# Patient Record
Sex: MALE | Race: WHITE | NOT HISPANIC OR LATINO | Employment: OTHER | URBAN - METROPOLITAN AREA
[De-identification: names, ages, dates, MRNs, and addresses within clinical notes are randomized per-mention and may not be internally consistent; named-entity substitution may affect disease eponyms.]

---

## 2019-09-13 ENCOUNTER — HOSPITAL ENCOUNTER (OUTPATIENT)
Dept: URGENT CARE | Facility: CLINIC | Age: 59
Discharge: HOME | End: 2019-09-13

## 2020-11-11 ENCOUNTER — HOSPITAL ENCOUNTER (OUTPATIENT)
Dept: GENERAL RADIOLOGY | Facility: HOSPITAL | Age: 60
Discharge: HOME | End: 2020-11-11
Attending: NURSE PRACTITIONER

## 2021-01-18 ENCOUNTER — OFFICE VISIT CONVERTED (OUTPATIENT)
Dept: GASTROENTEROLOGY | Facility: CLINIC | Age: 61
End: 2021-01-18
Attending: NURSE PRACTITIONER

## 2021-03-12 ENCOUNTER — HOSPITAL ENCOUNTER (OUTPATIENT)
Dept: SLEEP MEDICINE | Facility: HOSPITAL | Age: 61
Discharge: HOME | End: 2021-03-12
Attending: INTERNAL MEDICINE

## 2021-03-25 ENCOUNTER — HOSPITAL ENCOUNTER (OUTPATIENT)
Dept: SLEEP MEDICINE | Facility: HOSPITAL | Age: 61
Discharge: HOME | End: 2021-03-25
Attending: INTERNAL MEDICINE

## 2021-05-10 NOTE — H&P
History and Physical      Patient Name: Igor De Luna   Patient ID: [id number]   Sex: Male   YOB: 1960    Primary Care Provider: PATT COLLINS   Referring Provider: PATT COLLINS    Visit Date: January 18, 2021    Provider: CHANTELL Forbes   Location: [facility]   Location Address: [address] [place] [address], [address]  [place], [place]  [id number]   Location Phone: [phone]          Chief Complaint  · Abdominal pain      History Of Present Illness  The patient is a 60 year old male who presents on referral from PATT COLLINS for a gastroenterology evaluation.      Patient reports periumbilical pressure that radiates to his back on a few occasion's this past November. He   felt a need to have a BM and after he did the pain was relieved. Has not noticed the pain in the last 3 months. He does recall during the time he was working out often and eating handfuls of nuts for protein intake. He has since stopped and that is the only thing he has changed since symptoms resolved. Normally has a bowel movement 2x daily, formed stool. Denies any hematochezia, melena, or family hx of colon cancer. Appetite and weight stable. Denies frequent NSAID usage.     Colonoscopy 9+ years ago at [facility], requesting records. Pt reports it was normal.     CT of abdomen pelvis with contrast 11/11/2020: Diverticulosis. No evidence for acute abnormality throughout the abdomen or pelvis.       Medication List  Allegra-D 12 Hour  mg oral tablet extended release 12 hr; Arthritis Pain (diclofenac) 1 % topical gel         Allergy List  NO KNOWN DRUG ALLERGIES       Allergies Reconciled  Family Medical History  No family history of colorectal cancer         Social History  Tobacco (Never)         Review of Systems  · Constitutional  o Denies  o : chills, fever  · Eyes  o Denies  o : blurred vision, changes in vision  · Cardiovascular  o Denies  o : chest pain,  syncope  · Respiratory  o Denies  o : shortness of breath, dry cough  · Gastrointestinal  o Admits  o : See HPI  · Genitourinary  o Denies  o : dysuria, blood in urine  · Integument  o Denies  o : rash, new skin lesions  · Neurologic  o Denies  o : altered mental status, tingling or numbness  · Musculoskeletal  o Denies  o : joint pain, limitation of motion  · Endocrine  o Denies  o : weight gain, weight loss  · Psychiatric  o Denies  o : anxiety, depression      Physical Examination  · Constitutional  o Appearance  o : well developed, well-nourished, in no acute distress  · Eyes  o Vision  o :   § Visual Fields  § : eyes move symmetrical in all directions  o Sclerae  o : anicteric  o Pupils and Irises  o : pupils equal and symmetrical  · Neck  o Inspection/Palpation  o : supple  · Respiratory  o Respiratory Effort  o : breathing unlabored  o Inspection of Chest  o : normal appearance, no retractions  o Auscultation of Lungs  o : clear to auscultation bilaterally  · Cardiovascular  o Heart  o :   § Auscultation of Heart  § : no murmurs, gallops or rubs  · Gastrointestinal  o Abdominal Examination  o : soft, nontender to palpation, with normal active bowel sounds, no appreciable hepatosplenomegaly  o Digital Rectal Exam  o : deferred  · Lymphatic  o Neck  o : no palpable lymphadenopathy  · Skin and Subcutaneous Tissue  o General Inspection  o : without focal lesions; turgor is normal  · Psychiatric  o General  o : Alert and oriented x3  o Mood and Affect  o : Mood and affect are appropriate to circumstances          Assessment  · Diverticulosis     562.10/K57.90      Plan  · Medications  o Medications have been Reconciled  · Instructions  o Information given on current diagnoses.  o Lifestyle modifications discussed.  o Requesting pt's last colonoscopy. Will add to recall after when due for screening. Instructed pt to please call office if abdominal pain returns.  o Also educated on diverticulosis and preventing  constipation as well as limiting nut and seed intake.   o Electronically Identified Patient Education Materials Provided Electronically  · Disposition  o Follow up PRN-Call if any change in bowel pattern, abdominal pain, rectal bleeding, or any new GI complaint            Electronically Signed by: CHANTELL Forbes -Author on January 18, 2021 12:28:25 PM

## 2021-05-14 VITALS
HEIGHT: 64 IN | WEIGHT: 157.37 LBS | HEART RATE: 86 BPM | SYSTOLIC BLOOD PRESSURE: 150 MMHG | BODY MASS INDEX: 26.87 KG/M2 | DIASTOLIC BLOOD PRESSURE: 86 MMHG

## 2021-05-21 ENCOUNTER — HOSPITAL ENCOUNTER (OUTPATIENT)
Dept: SLEEP MEDICINE | Facility: HOSPITAL | Age: 61
Discharge: HOME | End: 2021-05-21
Attending: INTERNAL MEDICINE

## 2021-08-27 ENCOUNTER — OFFICE VISIT (OUTPATIENT)
Dept: SLEEP MEDICINE | Facility: HOSPITAL | Age: 61
End: 2021-08-27

## 2021-08-27 VITALS
WEIGHT: 164 LBS | SYSTOLIC BLOOD PRESSURE: 114 MMHG | HEART RATE: 71 BPM | OXYGEN SATURATION: 96 % | BODY MASS INDEX: 28 KG/M2 | DIASTOLIC BLOOD PRESSURE: 74 MMHG | TEMPERATURE: 98.4 F | HEIGHT: 64 IN

## 2021-08-27 DIAGNOSIS — G47.33 OSA (OBSTRUCTIVE SLEEP APNEA): Primary | ICD-10-CM

## 2021-08-27 PROCEDURE — G0463 HOSPITAL OUTPT CLINIC VISIT: HCPCS

## 2021-08-27 NOTE — PROGRESS NOTES
"                      [facility]                          Chief Complaint:   Follow up for obstructive sleep apnea    History of present illness:   Subjective     MATTHEW:  HST 3/25/2021: RANJEET 33/H; Gabriel SpO2 75%; hypoxic burden 56-min (11%); AURE 32/H.    Patient started on CPAP therapy.  He is here today for Follow-up on CPAP.  He reported improvement in his sleep at night and daytime alertness.    ESS: Total score: 0         Objective   Vital Signs:  Vitals:    08/27/21 0900   BP: 114/74   Pulse: 71   Temp: 98.4 °F (36.9 °C)   SpO2: 96%   Weight: 74.4 kg (164 lb)   Height: 162.6 cm (64\")     Body mass index is 28.15 kg/m².          Physical Exam:   General Appearance:    Alert, cooperative, in no acute distress   ENMT:  Freidman score 3,   Neck:  Trachea midline. No thyromegaly.   Lungs:     Clear to auscultation,respirations regular, even and                  unlabored    Heart:    Regular rhythm and normal rate, normal S1 and S2, no            Murmur.       Neuro:   Conscious, alert, oriented x3. Appropriate mood and affect.    Extremities:   Moves all extremities well, no edema, no cyanosis, no             Redness              Diagnostic data:    CPAP download showed:  Date: Last 30 days  Usage (days): 100 %  Days used>4h: 97 %  AHI: 1.9/h  Leak: 4.6   Usage: 6h and 34 min  P 90% : 9  Auto CPAP: 5-15 cm H2O    Assessment   1. MATTHEW        PLAN:  Discussed the result of the download.   Patient is compliant with therapy and clinically benefit from treatment.  Patient was encouraged to continue using his CPAP.  Refill supplies.                  This note was dictated utilizing Dragon dictation  "

## 2022-04-25 ENCOUNTER — TRANSCRIBE ORDERS (OUTPATIENT)
Dept: ADMINISTRATIVE | Facility: HOSPITAL | Age: 62
End: 2022-04-25

## 2022-04-25 DIAGNOSIS — M54.50 LOW BACK PAIN, UNSPECIFIED BACK PAIN LATERALITY, UNSPECIFIED CHRONICITY, UNSPECIFIED WHETHER SCIATICA PRESENT: Primary | ICD-10-CM

## 2022-05-12 ENCOUNTER — HOSPITAL ENCOUNTER (OUTPATIENT)
Dept: MRI IMAGING | Facility: HOSPITAL | Age: 62
Discharge: HOME | End: 2022-05-12
Admitting: NURSE PRACTITIONER

## 2022-05-12 DIAGNOSIS — M54.50 LOW BACK PAIN, UNSPECIFIED BACK PAIN LATERALITY, UNSPECIFIED CHRONICITY, UNSPECIFIED WHETHER SCIATICA PRESENT: ICD-10-CM

## 2022-05-12 PROCEDURE — 72148 MRI LUMBAR SPINE W/O DYE: CPT

## 2022-07-01 ENCOUNTER — OFFICE VISIT (OUTPATIENT)
Dept: NEUROSURGERY | Facility: CLINIC | Age: 62
End: 2022-07-01

## 2022-07-01 VITALS
HEIGHT: 63 IN | WEIGHT: 162.6 LBS | HEART RATE: 67 BPM | BODY MASS INDEX: 28.81 KG/M2 | SYSTOLIC BLOOD PRESSURE: 136 MMHG | DIASTOLIC BLOOD PRESSURE: 71 MMHG

## 2022-07-01 DIAGNOSIS — M51.26 HERNIATED NUCLEUS PULPOSUS, L4-5: Primary | ICD-10-CM

## 2022-07-01 DIAGNOSIS — M54.42 CHRONIC MIDLINE LOW BACK PAIN WITH LEFT-SIDED SCIATICA: ICD-10-CM

## 2022-07-01 DIAGNOSIS — M47.816 LUMBAR FACET ARTHROPATHY: ICD-10-CM

## 2022-07-01 DIAGNOSIS — G89.29 CHRONIC MIDLINE LOW BACK PAIN WITH LEFT-SIDED SCIATICA: ICD-10-CM

## 2022-07-01 PROCEDURE — 99204 OFFICE O/P NEW MOD 45 MIN: CPT | Performed by: PHYSICIAN ASSISTANT

## 2022-07-01 RX ORDER — ZINC SULFATE 50(220)MG
220 CAPSULE ORAL DAILY
COMMUNITY

## 2022-07-01 RX ORDER — CHLORAL HYDRATE 500 MG
CAPSULE ORAL
COMMUNITY

## 2022-07-01 RX ORDER — METHOCARBAMOL 500 MG/1
500 TABLET, FILM COATED ORAL 3 TIMES DAILY
COMMUNITY
Start: 2022-04-25

## 2022-07-01 RX ORDER — FEXOFENADINE HCL 180 MG/1
180 TABLET ORAL DAILY
COMMUNITY

## 2022-07-01 RX ORDER — DICLOFENAC SODIUM 75 MG/1
1 TABLET, DELAYED RELEASE ORAL 2 TIMES DAILY
COMMUNITY
Start: 2022-05-19

## 2022-07-01 RX ORDER — MELATONIN
1000 DAILY
COMMUNITY

## 2022-07-01 RX ORDER — VIT C/B6/B5/MAGNESIUM/HERB 173 50-5-6-5MG
CAPSULE ORAL
COMMUNITY

## 2022-07-01 NOTE — PROGRESS NOTES
"Chief Complaint  Neck Pain, Back Pain, and Numbness (Left leg / greater toe)    Subjective          Igor De Luna who is a 62 y.o. year old male who presents to [facility] for Evaluation of the Spine.     The patient complains of pain located in the Lumbar Spine.  Patients states the pain has been present for \"years\". He reports new pain starting several months ago, however. The pain came on gradually.  The pain scaled level is 7.  The pain does not radiate.  The pain is Intermittent and described as dull and aching.  The pain is worse at no particular time of day. Patient states Heat and Muscle Relaxants makes the pain better.  Patient states Prolonged Standing, Prolonged Sitting and Bending makes the pain worse.    Associated Symptoms Include: Numbness in the left leg to the big toe, this is intermittent. He denies weakness in the left leg. He denies loss of bowel or bladder control.  Conservative Interventions Include: NSAIDs that were ineffective. and Muscle Relaxants that were somewhat effective.    Was this the result of an injury or accident?: No    History of Previous Spinal Surgery?: No     reports that he has never smoked. He does not have any smokeless tobacco history on file.    Review of Systems   Musculoskeletal: Positive for back pain and neck pain.   Neurological: Positive for numbness.        Objective   Vital Signs:   /71   Pulse 67   Ht 160 cm (63\")   Wt 73.8 kg (162 lb 9.6 oz)   BMI 28.80 kg/m²       Physical Exam  Constitutional:       Appearance: Normal appearance. He is normal weight.   Pulmonary:      Effort: Pulmonary effort is normal.   Musculoskeletal:         General: Tenderness (midline lumbar spine, right SI joint area) present.      Comments: SLR negative bilaterally   Neurological:      General: No focal deficit present.      Mental Status: He is alert and oriented to person, place, and time.      Sensory: No sensory deficit.      " Motor: No weakness.      Deep Tendon Reflexes: Reflexes normal.   Psychiatric:         Mood and Affect: Mood normal.         Behavior: Behavior normal.        Neurologic Exam     Mental Status   Oriented to person, place, and time.        Result Review     I have personally reviewed the MRI of lumbar spine without contrast from 5/12/2022 which shows multilevel degenerative disc disease and facet arthritis, there is moderate central canal stenosis at L2-L3 with mild to moderate bilateral foraminal stenosis worse on the right, there is moderate central canal stenosis at L4-L5 with mild bilateral foraminal stenosis.       Assessment and Plan {CC Problem List  Visit Diagnosis   ROS  Review (Popup)  [facility]  Quality  BestPractice  Medications  SmartSets  SnapShot Encounters  Media :23}   Diagnoses and all orders for this visit:    1. Herniated nucleus pulposus, L4-5 (Primary)  -     Ambulatory Referral to Physical Therapy Evaluate and treat; Heat, Electrotherapy; E-stim; Stretching, ROM, Strengthening    2. Chronic midline low back pain with left-sided sciatica  -     Ambulatory Referral to Physical Therapy Evaluate and treat; Heat, Electrotherapy; E-stim; Stretching, ROM, Strengthening    3. Lumbar facet arthropathy  -     Ambulatory Referral to Physical Therapy Evaluate and treat; Heat, Electrotherapy; E-stim; Stretching, ROM, Strengthening    His pain is mostly in the back.    He does have numbness in the left leg to the big toe. He has stenosis on the left at L4-L5 which may affect the L4 nerve. Without pain in the leg I do not expect that the benefit of surgery would outweigh the risk. It would not help his back pain.    He could consider a trial of physical therapy targeting the lumbar spine and core strength. I will refer him today.    He could consider a trial of LESB for the right leg numbness or MBBs/RFA in the lumbar spine for the back pain. He is deferring pain management referral  today.    The patient was counseled on basic recommendations for the reduction and prevention of back, neck, or spine pain in association with spinal disorders, including: cessation/avoidance of nicotine use, maintenance of a healthy BMI and weight, focusing on building/maintaining core strength through core exercise, and avoidance of activities which worsen the pain. The patient will monitor for changes in symptoms and notify our clinic of these changes as needed.    He will follow-up here PRN for failure to improve or worsening symptoms.    Follow Up   Return if symptoms worsen or fail to improve.  Patient was given instructions and counseling regarding his condition or for health maintenance advice. Please see specific information pulled into the AVS if appropriate.

## 2022-07-23 ENCOUNTER — APPOINTMENT (OUTPATIENT)
Dept: CARDIOLOGY | Facility: HOSPITAL | Age: 62
End: 2022-07-23

## 2022-07-23 ENCOUNTER — HOSPITAL ENCOUNTER (EMERGENCY)
Facility: HOSPITAL | Age: 62
Discharge: HOME | End: 2022-07-23
Attending: EMERGENCY MEDICINE | Admitting: EMERGENCY MEDICINE

## 2022-07-23 VITALS
SYSTOLIC BLOOD PRESSURE: 108 MMHG | HEART RATE: 74 BPM | WEIGHT: 156.09 LBS | HEIGHT: 63 IN | TEMPERATURE: 97.9 F | BODY MASS INDEX: 27.66 KG/M2 | RESPIRATION RATE: 16 BRPM | OXYGEN SATURATION: 99 % | DIASTOLIC BLOOD PRESSURE: 73 MMHG

## 2022-07-23 DIAGNOSIS — L03.115 CELLULITIS OF RIGHT LOWER LEG: ICD-10-CM

## 2022-07-23 DIAGNOSIS — M79.661 RIGHT CALF PAIN: Primary | ICD-10-CM

## 2022-07-23 LAB
ALBUMIN SERPL-MCNC: 4 G/DL (ref 3.5–5.2)
ALBUMIN/GLOB SERPL: 1.5 G/DL
ALP SERPL-CCNC: 87 U/L (ref 39–117)
ALT SERPL W P-5'-P-CCNC: 68 U/L (ref 1–41)
ANION GAP SERPL CALCULATED.3IONS-SCNC: 9.5 MMOL/L (ref 5–15)
AST SERPL-CCNC: 35 U/L (ref 1–40)
BASOPHILS # BLD AUTO: 0.03 10*3/MM3 (ref 0–0.2)
BASOPHILS NFR BLD AUTO: 0.3 % (ref 0–1.5)
BH CV LOWER VASCULAR LEFT COMMON FEMORAL AUGMENT: NORMAL
BH CV LOWER VASCULAR LEFT COMMON FEMORAL COMPETENT: NORMAL
BH CV LOWER VASCULAR LEFT COMMON FEMORAL COMPRESS: NORMAL
BH CV LOWER VASCULAR LEFT COMMON FEMORAL PHASIC: NORMAL
BH CV LOWER VASCULAR LEFT COMMON FEMORAL SPONT: NORMAL
BH CV LOWER VASCULAR RIGHT COMMON FEMORAL AUGMENT: NORMAL
BH CV LOWER VASCULAR RIGHT COMMON FEMORAL COMPETENT: NORMAL
BH CV LOWER VASCULAR RIGHT COMMON FEMORAL COMPRESS: NORMAL
BH CV LOWER VASCULAR RIGHT COMMON FEMORAL PHASIC: NORMAL
BH CV LOWER VASCULAR RIGHT COMMON FEMORAL SPONT: NORMAL
BH CV LOWER VASCULAR RIGHT DISTAL FEMORAL COMPRESS: NORMAL
BH CV LOWER VASCULAR RIGHT GASTRONEMIUS COMPRESS: NORMAL
BH CV LOWER VASCULAR RIGHT GREATER SAPH AK COMPRESS: NORMAL
BH CV LOWER VASCULAR RIGHT GREATER SAPH BK COMPRESS: NORMAL
BH CV LOWER VASCULAR RIGHT LESSER SAPH COMPRESS: NORMAL
BH CV LOWER VASCULAR RIGHT MID FEMORAL AUGMENT: NORMAL
BH CV LOWER VASCULAR RIGHT MID FEMORAL COMPETENT: NORMAL
BH CV LOWER VASCULAR RIGHT MID FEMORAL COMPRESS: NORMAL
BH CV LOWER VASCULAR RIGHT MID FEMORAL PHASIC: NORMAL
BH CV LOWER VASCULAR RIGHT MID FEMORAL SPONT: NORMAL
BH CV LOWER VASCULAR RIGHT PERONEAL COMPRESS: NORMAL
BH CV LOWER VASCULAR RIGHT POPLITEAL AUGMENT: NORMAL
BH CV LOWER VASCULAR RIGHT POPLITEAL COMPETENT: NORMAL
BH CV LOWER VASCULAR RIGHT POPLITEAL COMPRESS: NORMAL
BH CV LOWER VASCULAR RIGHT POPLITEAL PHASIC: NORMAL
BH CV LOWER VASCULAR RIGHT POPLITEAL SPONT: NORMAL
BH CV LOWER VASCULAR RIGHT POSTERIOR TIBIAL COMPRESS: NORMAL
BH CV LOWER VASCULAR RIGHT PROXIMAL FEMORAL COMPRESS: NORMAL
BH CV LOWER VASCULAR RIGHT SAPHENOFEMORAL JUNCTION COMPRESS: NORMAL
BILIRUB SERPL-MCNC: 0.4 MG/DL (ref 0–1.2)
BUN SERPL-MCNC: 22 MG/DL (ref 8–23)
BUN/CREAT SERPL: 18.3 (ref 7–25)
CALCIUM SPEC-SCNC: 9.4 MG/DL (ref 8.6–10.5)
CHLORIDE SERPL-SCNC: 103 MMOL/L (ref 98–107)
CO2 SERPL-SCNC: 25.5 MMOL/L (ref 22–29)
CREAT SERPL-MCNC: 1.2 MG/DL (ref 0.76–1.27)
D DIMER PPP FEU-MCNC: 0.68 MCGFEU/ML (ref 0–0.57)
DEPRECATED RDW RBC AUTO: 41.7 FL (ref 37–54)
EGFRCR SERPLBLD CKD-EPI 2021: 69.2 ML/MIN/1.73
EOSINOPHIL # BLD AUTO: 0.33 10*3/MM3 (ref 0–0.4)
EOSINOPHIL NFR BLD AUTO: 3.2 % (ref 0.3–6.2)
ERYTHROCYTE [DISTWIDTH] IN BLOOD BY AUTOMATED COUNT: 12.6 % (ref 12.3–15.4)
GLOBULIN UR ELPH-MCNC: 2.7 GM/DL
GLUCOSE SERPL-MCNC: 110 MG/DL (ref 65–99)
HCT VFR BLD AUTO: 43.6 % (ref 37.5–51)
HGB BLD-MCNC: 15.4 G/DL (ref 13–17.7)
IMM GRANULOCYTES # BLD AUTO: 0.07 10*3/MM3 (ref 0–0.05)
IMM GRANULOCYTES NFR BLD AUTO: 0.7 % (ref 0–0.5)
LYMPHOCYTES # BLD AUTO: 2.56 10*3/MM3 (ref 0.7–3.1)
LYMPHOCYTES NFR BLD AUTO: 24.5 % (ref 19.6–45.3)
MAXIMAL PREDICTED HEART RATE: 160 BPM
MCH RBC QN AUTO: 31.8 PG (ref 26.6–33)
MCHC RBC AUTO-ENTMCNC: 35.3 G/DL (ref 31.5–35.7)
MCV RBC AUTO: 89.9 FL (ref 79–97)
MONOCYTES # BLD AUTO: 0.88 10*3/MM3 (ref 0.1–0.9)
MONOCYTES NFR BLD AUTO: 8.4 % (ref 5–12)
NEUTROPHILS NFR BLD AUTO: 6.57 10*3/MM3 (ref 1.7–7)
NEUTROPHILS NFR BLD AUTO: 62.9 % (ref 42.7–76)
NRBC BLD AUTO-RTO: 0 /100 WBC (ref 0–0.2)
PLATELET # BLD AUTO: 328 10*3/MM3 (ref 140–450)
PMV BLD AUTO: 9.3 FL (ref 6–12)
POTASSIUM SERPL-SCNC: 4.2 MMOL/L (ref 3.5–5.2)
PROT SERPL-MCNC: 6.7 G/DL (ref 6–8.5)
RBC # BLD AUTO: 4.85 10*6/MM3 (ref 4.14–5.8)
SODIUM SERPL-SCNC: 138 MMOL/L (ref 136–145)
STRESS TARGET HR: 136 BPM
WBC NRBC COR # BLD: 10.44 10*3/MM3 (ref 3.4–10.8)

## 2022-07-23 PROCEDURE — 93971 EXTREMITY STUDY: CPT | Performed by: SURGERY

## 2022-07-23 PROCEDURE — 85025 COMPLETE CBC W/AUTO DIFF WBC: CPT

## 2022-07-23 PROCEDURE — 80053 COMPREHEN METABOLIC PANEL: CPT

## 2022-07-23 PROCEDURE — 85379 FIBRIN DEGRADATION QUANT: CPT

## 2022-07-23 PROCEDURE — 99283 EMERGENCY DEPT VISIT LOW MDM: CPT

## 2022-07-23 PROCEDURE — 93971 EXTREMITY STUDY: CPT

## 2022-07-23 RX ORDER — KETOROLAC TROMETHAMINE 30 MG/ML
30 INJECTION, SOLUTION INTRAMUSCULAR; INTRAVENOUS ONCE
Status: DISCONTINUED | OUTPATIENT
Start: 2022-07-23 | End: 2022-07-23

## 2022-07-23 RX ORDER — SULFAMETHOXAZOLE AND TRIMETHOPRIM 800; 160 MG/1; MG/1
1 TABLET ORAL 2 TIMES DAILY
Qty: 20 TABLET | Refills: 0 | Status: SHIPPED | OUTPATIENT
Start: 2022-07-23

## 2022-07-23 RX ORDER — DIPHENHYDRAMINE HYDROCHLORIDE 50 MG/ML
25 INJECTION INTRAMUSCULAR; INTRAVENOUS ONCE
Status: DISCONTINUED | OUTPATIENT
Start: 2022-07-23 | End: 2022-07-23

## 2022-07-23 RX ORDER — METOCLOPRAMIDE HYDROCHLORIDE 5 MG/ML
10 INJECTION INTRAMUSCULAR; INTRAVENOUS ONCE
Status: DISCONTINUED | OUTPATIENT
Start: 2022-07-23 | End: 2022-07-23

## 2022-07-23 NOTE — DISCHARGE INSTRUCTIONS
Take Bactrim as directed.  Apply heat packs 15 to 20 minutes at a time 4-5 times a day to improve the pain and swelling.  Follow-up with your primary care provider in 3 days if your symptoms are not improving.  Return to the emergency department if you have worsening swelling, chest pain, or shortness of breath.

## 2022-07-23 NOTE — ED PROVIDER NOTES
"Time: 8:53 AM EDT  Arrived by: private car  Chief Complaint: Right leg pain  History provided by: Patient  History is limited by: N/A     History of Present Illness:  Patient is a 62 y.o. year old male who presents to the emergency department with right leg pain.    Patient presents the emergency department today complaining of right leg pain with swelling and redness.  Patient admits to having what he thought was a charley horse for the past 2 days, but noticed this morning that there was a \"lump\" and redness to his posterior right calf.  Patient states the right calf is painful with weightbearing.  Patient states he was diagnosed with COVID earlier this month, and has been feeling well for approximately 2 weeks.  Patient denies being on any blood thinners.  Patient denies history of DVT.  Patient denies chest pain, shortness of breath, other associated symptoms.  No other complaints.      History provided by:  Patient   used: No    Leg Pain  Location:  Leg  Injury: no    Leg location:  R lower leg  Pain details:     Quality:  Cramping    Severity:  Mild    Onset quality:  Gradual    Duration: 3 days.    Timing:  Constant    Progression:  Waxing and waning  Chronicity:  New  Prior injury to area:  No  Worsened by:  Bearing weight  Associated symptoms: no back pain, no decreased ROM, no fatigue, no fever, no itching, no muscle weakness, no neck pain, no numbness, no stiffness, no swelling and no tingling        Similar Symptoms Previously: No  Recently seen: No      Patient Care Team  Primary Care Provider: Provider, No Known    Past Medical History:     No Known Allergies  Past Medical History:   Diagnosis Date   • Back problem    • Headache    • Hearing loss      Past Surgical History:   Procedure Laterality Date   • SHOULDER SURGERY       Family History   Problem Relation Age of Onset   • Cancer Father    • Cancer Sister    • Liver disease Brother        Home Medications:  Prior to Admission " medications    Medication Sig Start Date End Date Taking? Authorizing Provider   cholecalciferol (VITAMIN D3) 25 MCG (1000 UT) tablet Take 1,000 Units by mouth Daily.    Kenya Mendenhall MD   Cyanocobalamin ER 1000 MCG tablet controlled-release Take  by mouth.    Kenya Mendenhall MD   diclofenac (VOLTAREN) 75 MG EC tablet Take 1 tablet by mouth 2 (Two) Times a Day. 5/19/22   Kenya Mendenhall MD   ELDERBERRY PO Take  by mouth.    Kenya Mendenhall MD   fexofenadine (ALLEGRA) 180 MG tablet Take 180 mg by mouth Daily.    Kenya Mendenhall MD   Flaxseed, Linseed, (FLAX SEED OIL PO) Take  by mouth.    Kenya Mendenhall MD   methocarbamol (ROBAXIN) 500 MG tablet Take 500 mg by mouth 3 (Three) Times a Day. 4/25/22   Kenya Mendenhall MD   MULTIPLE VITAMINS-CALCIUM PO Take  by mouth.    Kenya Mendenhall MD   Omega-3 Fatty Acids (fish oil) 1000 MG capsule capsule Take  by mouth.    Kenya Mendenhall MD   Quercetin 250 MG tablet Take  by mouth.    Kenya Mendenhall MD   Turmeric 500 MG capsule Take  by mouth.    Kenya Mendenhall MD   Vitamin A 3 MG (10657 UT) capsule Take 10,000 Units by mouth Daily.    Kenya Mendenhall MD   Zinc 50 MG capsule Take  by mouth.    Kenya Mendenhall MD   zinc sulfate (ZINCATE) 220 (50 Zn) MG capsule Take 220 mg by mouth Daily.    Kenya Mendenhall MD        Social History:   Social History     Tobacco Use   • Smoking status: Never Smoker   • Smokeless tobacco: Never Used   Vaping Use   • Vaping Use: Never used   Substance Use Topics   • Drug use: Never     Recent travel: no     Review of Systems:  Review of Systems   Constitutional: Negative for chills, fatigue and fever.   HENT: Negative for congestion and ear pain.    Eyes: Negative for pain.   Respiratory: Negative for cough and shortness of breath.    Cardiovascular: Negative for chest pain.   Gastrointestinal: Negative for abdominal pain, diarrhea, nausea and vomiting.  "  Genitourinary: Negative for dysuria and hematuria.   Musculoskeletal: Negative for back pain, myalgias, neck pain and stiffness.        Right calf pain   Skin: Negative for itching and rash.   Neurological: Negative for dizziness and headaches.        Physical Exam:  /70 (Patient Position: Sitting)   Pulse 74   Temp 98 °F (36.7 °C) (Oral)   Resp 18   Ht 160 cm (63\")   Wt 70.8 kg (156 lb 1.4 oz)   SpO2 97%   BMI 27.65 kg/m²     Physical Exam  Vitals and nursing note reviewed.   Constitutional:       Appearance: Normal appearance. He is normal weight.   HENT:      Head: Normocephalic and atraumatic.      Nose: Nose normal.   Eyes:      Extraocular Movements: Extraocular movements intact.      Conjunctiva/sclera: Conjunctivae normal.      Pupils: Pupils are equal, round, and reactive to light.   Cardiovascular:      Rate and Rhythm: Normal rate and regular rhythm.      Heart sounds: Normal heart sounds.   Pulmonary:      Effort: Pulmonary effort is normal.      Breath sounds: Normal breath sounds.   Abdominal:      General: Abdomen is flat. Bowel sounds are normal.      Palpations: Abdomen is soft.      Tenderness: There is no abdominal tenderness.   Musculoskeletal:         General: Normal range of motion.      Cervical back: Normal range of motion and neck supple.        Legs:    Skin:     General: Skin is warm and dry.   Neurological:      General: No focal deficit present.      Mental Status: He is alert and oriented to person, place, and time.   Psychiatric:         Mood and Affect: Mood normal.         Behavior: Behavior normal.         Thought Content: Thought content normal.         Judgment: Judgment normal.                Medications in the Emergency Department:  Medications - No data to display     Labs  Lab Results (last 24 hours)     Procedure Component Value Units Date/Time    CBC & Differential [[id number]]  (Abnormal) Collected: 07/23/22 0856    Specimen: Blood Updated: 07/23/22 0912    " Narrative:      The following orders were created for panel order CBC & Differential.  Procedure                               Abnormality         Status                     ---------                               -----------         ------                     CBC Auto Differential[[id number]]        Abnormal            Final result                 Please view results for these tests on the individual orders.    Comprehensive Metabolic Panel [[id number]]  (Abnormal) Collected: 07/23/22 0856    Specimen: Blood Updated: 07/23/22 0941     Glucose 110 mg/dL      BUN 22 mg/dL      Creatinine 1.20 mg/dL      Sodium 138 mmol/L      Potassium 4.2 mmol/L      Chloride 103 mmol/L      CO2 25.5 mmol/L      Calcium 9.4 mg/dL      Total Protein 6.7 g/dL      Albumin 4.00 g/dL      ALT (SGPT) 68 U/L      AST (SGOT) 35 U/L      Alkaline Phosphatase 87 U/L      Total Bilirubin 0.4 mg/dL      Globulin 2.7 gm/dL      A/G Ratio 1.5 g/dL      BUN/Creatinine Ratio 18.3     Anion Gap 9.5 mmol/L      eGFR 69.2 mL/min/1.73      Comment: National Kidney Foundation and American Society of Nephrology (ASN) Task Force recommended calculation based on the Chronic Kidney Disease Epidemiology Collaboration (CKD-EPI) equation refit without adjustment for race.       Narrative:      GFR Normal >60  Chronic Kidney Disease <60  Kidney Failure <15      D-dimer, Quantitative [[id number]]  (Abnormal) Collected: 07/23/22 0856    Specimen: Blood Updated: 07/23/22 0927     D-Dimer, Quantitative 0.68 MCGFEU/mL     Narrative:      The Stago D-Dimer test used in conjunction with a clinical pretest probability (PTP) assessment model, has been approved by the FDA to rule out the presence of venous thromboembolism (VTE) in outpatients suspected of deep venous thrombosis (DVT) or pulmonary embolism (PE). The cut-off for negative predictive value is <0.50 MCGFEU/mL.    CBC Auto Differential [[id number]]  (Abnormal) Collected: 07/23/22 0856    Specimen: Blood Updated:  07/23/22 0912     WBC 10.44 10*3/mm3      RBC 4.85 10*6/mm3      Hemoglobin 15.4 g/dL      Hematocrit 43.6 %      MCV 89.9 fL      MCH 31.8 pg      MCHC 35.3 g/dL      RDW 12.6 %      RDW-SD 41.7 fl      MPV 9.3 fL      Platelets 328 10*3/mm3      Neutrophil % 62.9 %      Lymphocyte % 24.5 %      Monocyte % 8.4 %      Eosinophil % 3.2 %      Basophil % 0.3 %      Immature Grans % 0.7 %      Neutrophils, Absolute 6.57 10*3/mm3      Lymphocytes, Absolute 2.56 10*3/mm3      Monocytes, Absolute 0.88 10*3/mm3      Eosinophils, Absolute 0.33 10*3/mm3      Basophils, Absolute 0.03 10*3/mm3      Immature Grans, Absolute 0.07 10*3/mm3      nRBC 0.0 /100 WBC            Imaging:  No Radiology Exams Resulted Within Past 24 Hours    Procedures:  Procedures    Progress  ED Course as of 07/23/22 1151   Sat Jul 23, 2022   1141 Vascular lab states patient is negative for DVT. [MD]   1147 Patient D-dimers is noted to ne elevated.  Patient pulse ox on room air is normal, patient is not tachycardic.  Patient does not have chest pain or shortness of breath.  No previous history of DVTs, duplex ultrasound on today's exam is negative for DVT. [MD]   1149 I discussed this patient with Dr. Mckenzie due to negative ultrasound and elevated D-dimer. [MD]      ED Course User Index  [MD] Jarret Mack PA-C Wells' Criteria (for DVT) reviewed and/or performed as part of the patient evaluation and treatment planning process.  The result associated with this review/performance is: 1        Medical Decision Making:  MDM  Number of Diagnoses or Management Options  Diagnosis management comments: I have spoken with patient. I have explained the patient´s condition, diagnoses and treatment plan based on the information available to me at this time. I have answered the patient's questions and addressed any concerns. The patient has a good  understanding of the patient´s diagnosis, condition, and treatment plan as can be expected  at this point. The vital signs have been stable. The patient´s condition is stable and appropriate for discharge from the emergency department.      The patient will pursue further outpatient evaluation with the primary care physician or other designated or consulting physician as outlined in the discharge instructions. They are agreeable to this plan of care and follow-up instructions have been explained in detail. The patient has received these instructions in written format and have expressed an understanding of the discharge instructions. The patient is aware that any significant change in condition or worsening of symptoms should prompt an immediate return to this or the closest emergency department or call to 911.       Amount and/or Complexity of Data Reviewed  Clinical lab tests: ordered and reviewed  Tests in the radiology section of CPT®: ordered and reviewed    Risk of Complications, Morbidity, and/or Mortality  Presenting problems: moderate  Diagnostic procedures: low  Management options: low    Patient Progress  Patient progress: stable       Final diagnoses:   Right calf pain   Cellulitis of right lower leg        Disposition:  ED Disposition     ED Disposition   Discharge    Condition   Stable    Comment   --             This medical record created using voice recognition software.           Jarret Mack PA-C  07/23/22 0989

## 2022-07-29 ENCOUNTER — TREATMENT (OUTPATIENT)
Dept: PHYSICAL THERAPY | Facility: CLINIC | Age: 62
End: 2022-07-29

## 2022-07-29 DIAGNOSIS — G89.29 CHRONIC LEFT-SIDED LOW BACK PAIN WITH LEFT-SIDED SCIATICA: Primary | ICD-10-CM

## 2022-07-29 DIAGNOSIS — M54.42 CHRONIC LEFT-SIDED LOW BACK PAIN WITH LEFT-SIDED SCIATICA: Primary | ICD-10-CM

## 2022-07-29 DIAGNOSIS — R29.898 IMPAIRED FLEXIBILITY OF LOWER EXTREMITY: ICD-10-CM

## 2022-07-29 DIAGNOSIS — R29.898 WEAKNESS OF BOTH LOWER EXTREMITIES: ICD-10-CM

## 2022-07-29 PROCEDURE — 97110 THERAPEUTIC EXERCISES: CPT | Performed by: PHYSICAL THERAPIST

## 2022-07-29 PROCEDURE — 97161 PT EVAL LOW COMPLEX 20 MIN: CPT | Performed by: PHYSICAL THERAPIST

## 2022-08-12 ENCOUNTER — TREATMENT (OUTPATIENT)
Dept: PHYSICAL THERAPY | Facility: CLINIC | Age: 62
End: 2022-08-12

## 2022-08-12 DIAGNOSIS — R29.898 WEAKNESS OF BOTH LOWER EXTREMITIES: ICD-10-CM

## 2022-08-12 DIAGNOSIS — R29.898 IMPAIRED FLEXIBILITY OF LOWER EXTREMITY: ICD-10-CM

## 2022-08-12 DIAGNOSIS — M54.42 CHRONIC LEFT-SIDED LOW BACK PAIN WITH LEFT-SIDED SCIATICA: Primary | ICD-10-CM

## 2022-08-12 DIAGNOSIS — G89.29 CHRONIC LEFT-SIDED LOW BACK PAIN WITH LEFT-SIDED SCIATICA: Primary | ICD-10-CM

## 2022-08-12 PROCEDURE — 97140 MANUAL THERAPY 1/> REGIONS: CPT | Performed by: PHYSICAL THERAPIST

## 2022-08-12 PROCEDURE — 97110 THERAPEUTIC EXERCISES: CPT | Performed by: PHYSICAL THERAPIST

## 2022-08-12 NOTE — PROGRESS NOTES
Physical Therapy Daily Note    VISIT#: 2    Subjective   Igor De Luna reports 5/10 pain today. Pt reports he walked 2 miles yesterday and did not notice his back hurting. Pt reports his L toe did go numb once this past week and lasted for 10-15 minutes and then went away. Pt reports he was pulling weeds yesterday and was really sore after pulling weeds last night.      Objective     See Exercise, Manual, and Modality Logs for complete treatment.     Assessment/Plan    Progressed patient abdominal strengthening exercises and patient tolerated with slight reports of increase in discomfort. Pt had increase in difficulty with moving R UE and L LE with birddogs and required several cues on technique with pelvic tilts but able to activate TA. Pt continues to have significant pain and tightness in R QL, may refrain from PA's next session due to patient's increase in guarding. Will continue to progress patient as able.    Progress per Plan of Care and Progress strengthening /stabilization /functional activity            Timed:                 Manual Therapy:    15     mins  74025;     Therapeutic Exercise:    10     mins  87343;     Neuromuscular Lino:    5    mins  98579;    Therapeutic Activity:     0     mins  13391;     Gait Trainin     mins  37236;     Ultrasound:     0     mins  68251;    Ionto                               0    mins   05513  Self pay                         0     mins [id number]    Un-Timed:  Electrical Stimulation:    0     mins  82300 ( )  Canalith Repos    0     mins 47550  Dry Needling     0     mins self-pay  Traction     0     mins 20067    Timed Treatment:   30   mins   Total Treatment:     30   mins    Rupa Rico PT  Physical Therapist    PT SIGNATURE: Electronically signed by Rupa Rico PT  [place] LICENSE: [id number]

## 2022-08-17 ENCOUNTER — TREATMENT (OUTPATIENT)
Dept: PHYSICAL THERAPY | Facility: CLINIC | Age: 62
End: 2022-08-17

## 2022-08-17 DIAGNOSIS — M54.42 CHRONIC LEFT-SIDED LOW BACK PAIN WITH LEFT-SIDED SCIATICA: Primary | ICD-10-CM

## 2022-08-17 DIAGNOSIS — G89.29 CHRONIC LEFT-SIDED LOW BACK PAIN WITH LEFT-SIDED SCIATICA: Primary | ICD-10-CM

## 2022-08-17 DIAGNOSIS — R29.898 WEAKNESS OF BOTH LOWER EXTREMITIES: ICD-10-CM

## 2022-08-17 DIAGNOSIS — R29.898 IMPAIRED FLEXIBILITY OF LOWER EXTREMITY: ICD-10-CM

## 2022-08-17 PROCEDURE — 97140 MANUAL THERAPY 1/> REGIONS: CPT | Performed by: PHYSICAL THERAPIST

## 2022-08-17 PROCEDURE — 97110 THERAPEUTIC EXERCISES: CPT | Performed by: PHYSICAL THERAPIST

## 2022-08-17 PROCEDURE — 97530 THERAPEUTIC ACTIVITIES: CPT | Performed by: PHYSICAL THERAPIST

## 2022-08-17 NOTE — PROGRESS NOTES
"Physical Therapy Daily Treatment Note      Patient: Igor De Luna   : 1960  Referring practitioner: Brooks Wong PA-C  Date of Initial Visit: Type: THERAPY  Noted: 2022  Today's Date: 2022  Patient seen for 3 sessions           Subjective  Igor De Luna reports: \"just a little bit of LBP but I was working outside last night. About 4-5/10, not bad.\"     Ron informed PTA of all the abdominal exercises he is performing daily. Advised him that we want to promote core stability yet he needs to strengthen his back extensors also. Discussed his stenosis and how his posture is working towards flexion and how that correlates need for extensor strength.     Objective   See Exercise, Manual, and Modality Logs for complete treatment.       Assessment/Plan  Time to include discussion as noted in subjective. Ron did not attain any relief with manual work today, voicing tenderness at spinal segments through lumbar spine. He reported he will not consider anything with needles as means of treatment. Continue per outlined POC.    Visit Diagnoses:    ICD-10-CM ICD-9-CM   1. Chronic left-sided low back pain with left-sided sciatica  M54.42 724.2    G89.29 724.3     338.29   2. Weakness of both lower extremities  R29.898 729.89   3. Impaired flexibility of lower extremity  R29.898 781.99       Progress per Plan of Care and Progress strengthening /stabilization /functional activity           Timed:  Manual Therapy:    10     mins  04443;  Therapeutic Exercise:    20     mins  62985;     Neuromuscular Lino:        mins  02321;    Therapeutic Activity:     8     mins  45424;     Gait Training:           mins  83668;     Ultrasound:          mins  63741;    Electrical Stimulation:         mins  94587 ( );  Aquatics  __   mins   17721    Untimed:  Electrical Stimulation:         mins  49622 ( );  Mechanical Traction:         mins  97811;     Timed Treatment:   38   mins   Total Treatment:     38   " mins    Electronically Signed:  Giovanna Lee PTA  Physical Therapist Assistant    [place] PTA license [id number]

## 2022-09-01 ENCOUNTER — TELEPHONE (OUTPATIENT)
Dept: PHYSICAL THERAPY | Facility: OTHER | Age: 62
End: 2022-09-01

## 2022-09-08 ENCOUNTER — TREATMENT (OUTPATIENT)
Dept: PHYSICAL THERAPY | Facility: CLINIC | Age: 62
End: 2022-09-08

## 2022-09-08 DIAGNOSIS — R29.898 IMPAIRED FLEXIBILITY OF LOWER EXTREMITY: ICD-10-CM

## 2022-09-08 DIAGNOSIS — G89.29 CHRONIC LEFT-SIDED LOW BACK PAIN WITH LEFT-SIDED SCIATICA: Primary | ICD-10-CM

## 2022-09-08 DIAGNOSIS — M54.42 CHRONIC LEFT-SIDED LOW BACK PAIN WITH LEFT-SIDED SCIATICA: Primary | ICD-10-CM

## 2022-09-08 DIAGNOSIS — R29.898 WEAKNESS OF BOTH LOWER EXTREMITIES: ICD-10-CM

## 2022-09-08 PROCEDURE — 97530 THERAPEUTIC ACTIVITIES: CPT | Performed by: PHYSICAL THERAPIST

## 2022-09-08 PROCEDURE — 97110 THERAPEUTIC EXERCISES: CPT | Performed by: PHYSICAL THERAPIST

## 2022-09-08 NOTE — PROGRESS NOTES
Progress Assessment        Patient: Igor De Luna   : 1960  Diagnosis/ICD-10 Code:  Chronic left-sided low back pain with left-sided sciatica [M54.42, G89.29]  Referring practitioner: Brooks Wong PA-C  Date of Initial Visit: Type: THERAPY  Noted: 2022  Today's Date: 2022  Patient seen for 4 sessions      Subjective:     Subjective Questionnaire: Oswestry:  = 17%   Clinical Progress: improved  Home Program Compliance: Yes  Treatment has included: therapeutic exercise, manual therapy and therapeutic activity    Subjective:  Pt reporting he has been getting sore with some of the manual therapy and stretching he feels. He is feeling ok today walking into the clinic. He is feeling better overall, was feeling under the weather last week. He relates feeling better when he exercises, feels that his back started hurting once he was sitting more working from home. He is going to try to start back at the gym to keep up with exercises.      Objective           Active Range of Motion     Additional Active Range of Motion Details  WFL in all planes with increase in pain with R rotation and with flexion today.     Strength/Myotome Testing      Left Hip   Planes of Motion   Flexion: 4+  Abduction: 4  Adduction: 4+     Right Hip   Planes of Motion   Flexion: 4+  Abduction: 4  Adduction: 4+       Assessment & Plan     Assessment  Impairments: activity intolerance, impaired physical strength, lacks appropriate home exercise program, pain with function and weight-bearing intolerance  Functional Limitations: lifting, walking, uncomfortable because of pain, sitting and standing  Assessment details: Pt presents to PT with LBP with radicular symptoms down L LE. He has progressed well with strength in his lower extremities, still limited with lumbar flexion and rotation at times. He has met one goal for subjective function, but he is expected to continue to progress with ongoing therapy. Pt tolerated today's  session well. Pt would benefit from continued skilled therapy to address deficits. Progress per plan of care.     Prognosis: good    Goals  Plan Goals: LUMBAR PROBLEMS:    1. The patient has complaints of pain.    LTG 1: 12 weeks: The patient will report lower back pain no greater than 1/10 in order to improve tolerance with activities of daily living and improve sleep quality.    STATUS: Not met    STG 1a: 6 weeks: The patient will report lower back pain no greater than 3/10.     STATUS: Not met      2. The patient demonstrates weakness of the bilateral hip.    LTG 2: 12 weeks: The patient will demonstrate 5/5 strength for bilateral hip flexion, abduction, and extension in order to improve hip stability.    STATUS: Not met    STG 2a: 6 weeks: The patient will demonstrate 4+/5 strength for bilateral hip flexion, abduction, and extension.    STATUS: Not met, progressing       Mobility: Walking/Moving Around Functional Limitation    LTG 3: 12 weeks: The patient will demonstrate 10% limitation on the Oswestry Disability Questionnaire.    STATUS: Not met    STG 3a: 6 weeks: The patient will demonstrate 18% limitation on the Oswestry Disability Quiestionnaire.    STATUS: MET      Plan  Therapy options: will be seen for skilled therapy services  Planned modality interventions: cryotherapy, TENS, dry needling and thermotherapy (hydrocollator packs)  Planned therapy interventions: manual therapy, neuromuscular re-education, ADL retraining, flexibility, functional ROM exercises, home exercise program, joint mobilization, soft tissue mobilization, strengthening, stretching, therapeutic activities, abdominal trunk stabilization, balance/weight-bearing training, postural training and spinal/joint mobilization  Frequency: 2x week  Duration in weeks: 12  Treatment plan discussed with: patient      Progress toward previous goals: Partially Met    See Exercise, Manual, and Modality Logs for complete treatment.          Recommendations: Continue as planned  Timeframe: 2 months  Prognosis to achieve goals: good    PT Signature: Manish Rodriges, PT    Electronically signed 2022    [place] License: PT - [id number]     Based upon review of the patient's progress and continued therapy plan, it is my medical opinion that Igor De Luna should continue physical therapy treatment at [facility]  [address]  [place] [address] [phone].      Timed:         Manual Therapy:    0     mins  20000;     Therapeutic Exercise:    24     mins  44601;     Neuromuscular Lino:    0    mins  21075;    Therapeutic Activity:     15     mins  24193;     Gait Trainin     mins  54445;     Ultrasound:     0     mins  22356;    Ionto                               0    mins   31153  Self Care                       0     mins   96090  Aquatic                          0     mins 44609      Un-Timed:  Electrical Stimulation:    0     mins  50416 ( );  Dry Needling     0     mins self-pay  Traction     0     mins 54622  Low Eval     0     Mins  05212  Mod Eval     0     Mins  66182  High Eval                       0     Mins  74292  Re-Eval                           0    mins  23271      Timed Treatment:   39   mins   Total Treatment:     39   mins      I certify that the therapy services are furnished while this patient is under my care.  The services outlined above are required by this patient, and will be reviewed every 90 days.

## 2022-09-12 ENCOUNTER — TREATMENT (OUTPATIENT)
Dept: PHYSICAL THERAPY | Facility: CLINIC | Age: 62
End: 2022-09-12

## 2022-09-12 DIAGNOSIS — G89.29 CHRONIC LEFT-SIDED LOW BACK PAIN WITH LEFT-SIDED SCIATICA: Primary | ICD-10-CM

## 2022-09-12 DIAGNOSIS — M54.42 CHRONIC LEFT-SIDED LOW BACK PAIN WITH LEFT-SIDED SCIATICA: Primary | ICD-10-CM

## 2022-09-12 DIAGNOSIS — R29.898 IMPAIRED FLEXIBILITY OF LOWER EXTREMITY: ICD-10-CM

## 2022-09-12 DIAGNOSIS — R29.898 WEAKNESS OF BOTH LOWER EXTREMITIES: ICD-10-CM

## 2022-09-12 PROCEDURE — 97110 THERAPEUTIC EXERCISES: CPT | Performed by: PHYSICAL THERAPIST

## 2022-09-12 PROCEDURE — 97530 THERAPEUTIC ACTIVITIES: CPT | Performed by: PHYSICAL THERAPIST

## 2022-09-12 NOTE — PROGRESS NOTES
"Physical Therapy Daily Treatment Note      Patient: Igor De Luna   : 1960  Referring practitioner: Brooks Wong PA-C  Date of Initial Visit: Type: THERAPY  Noted: 2022  Today's Date: 2022  Patient seen for 5 sessions           Subjective  Igor De Luna reports: \"we joined the [facility], was at the gym this morning at 0440.\"  Ron explained that while he was working out he interspersed stretching with strengthening. He noted that he feels the band exercises , indicating his paraspiinals. PTA commented that this may be due to activation of weakened musculature.       Objective   See Exercise, Manual, and Modality Logs for complete treatment.       Assessment/Plan  Ron reported that he has not been performing the abdominal workouts that he told PTA about during the first time she saw him (22). Ron had no c/o when leaving session except to comment that he feels the banded exercises as noted in subjective. Skilled care required as outlined in progress note 22.    Visit Diagnoses:    ICD-10-CM ICD-9-CM   1. Chronic left-sided low back pain with left-sided sciatica  M54.42 724.2    G89.29 724.3     338.29   2. Weakness of both lower extremities  R29.898 729.89   3. Impaired flexibility of lower extremity  R29.898 781.99       Progress per Plan of Care and Progress strengthening /stabilization /functional activity           Timed:  Manual Therapy:         mins  82045;  Therapeutic Exercise:    20     mins  79914;     Neuromuscular Lino:        mins  05089;    Therapeutic Activity:     12     mins  72172;     Gait Training:           mins  63529;     Ultrasound:          mins  05760;    Electrical Stimulation:         mins  16571 ( );  Aquatics  __   mins   95511    Untimed:  Electrical Stimulation:         mins  62424 ( );  Mechanical Traction:         mins  17624;     Timed Treatment:   32   mins   Total Treatment:     32   mins    Electronically Signed:  Giovanna Lee, " PTA  Physical Therapist Assistant    [place] PTA license [id number]

## 2022-09-14 ENCOUNTER — TREATMENT (OUTPATIENT)
Dept: PHYSICAL THERAPY | Facility: CLINIC | Age: 62
End: 2022-09-14

## 2022-09-14 DIAGNOSIS — R29.898 WEAKNESS OF BOTH LOWER EXTREMITIES: ICD-10-CM

## 2022-09-14 DIAGNOSIS — M54.42 CHRONIC LEFT-SIDED LOW BACK PAIN WITH LEFT-SIDED SCIATICA: Primary | ICD-10-CM

## 2022-09-14 DIAGNOSIS — G89.29 CHRONIC LEFT-SIDED LOW BACK PAIN WITH LEFT-SIDED SCIATICA: Primary | ICD-10-CM

## 2022-09-14 DIAGNOSIS — R29.898 IMPAIRED FLEXIBILITY OF LOWER EXTREMITY: ICD-10-CM

## 2022-09-14 PROCEDURE — 97530 THERAPEUTIC ACTIVITIES: CPT | Performed by: PHYSICAL THERAPIST

## 2022-09-14 PROCEDURE — 97110 THERAPEUTIC EXERCISES: CPT | Performed by: PHYSICAL THERAPIST

## 2022-09-14 NOTE — PROGRESS NOTES
Physical Therapy Daily Treatment Note        Patient: Igor De Luna   : 1960  Diagnosis/ICD-10 Code:  Chronic left-sided low back pain with left-sided sciatica [M54.42, G89.29]  Referring practitioner: Brooks Wong PA-C  Date of Initial Visit: Type: THERAPY  Noted: 2022  Today's Date: 2022  Patient seen for 6 sessions             Subjective   Igor De Luna reports: being sore because he first started working out again, states that he hasn't had any numbness in the leg this past week.     Objective   Minimal discomfort with trunk rotation to the L side.     See Exercise, Manual, and Modality Logs for complete treatment.       Assessment/Plan  Igor progressing as evident by decreased overall back  pain. Pt tolerated exercises well, minimal discomfort with trunk rotation. Pt would benefit from skilled PT to address Range of Motion  and Strength deficits, pain management and any concerns with ADLs.       Progress per Plan of Care           Timed:  Manual Therapy:         mins  95977;  Therapeutic Exercise:    20     mins  31115;     Neuromuscular Lino:        mins  83976;    Therapeutic Activity:     10     mins  47452;     Gait Training:           mins  86191;    Aquatic Therapy:          mins  31512;       Untimed:  Electrical Stimulation:         mins  98206 ( );  Mechanical Traction:         mins  19561;       Timed Treatment:   30   mins   Total Treatment:     30   mins      Electronically signed:   Roma Rodriges PTA  Physical Therapist Assistant  [place] [employer] License #: [id number]

## 2022-09-19 ENCOUNTER — TREATMENT (OUTPATIENT)
Dept: PHYSICAL THERAPY | Facility: CLINIC | Age: 62
End: 2022-09-19

## 2022-09-19 DIAGNOSIS — R29.898 IMPAIRED FLEXIBILITY OF LOWER EXTREMITY: ICD-10-CM

## 2022-09-19 DIAGNOSIS — G89.29 CHRONIC LEFT-SIDED LOW BACK PAIN WITH LEFT-SIDED SCIATICA: Primary | ICD-10-CM

## 2022-09-19 DIAGNOSIS — R29.898 WEAKNESS OF BOTH LOWER EXTREMITIES: ICD-10-CM

## 2022-09-19 DIAGNOSIS — M54.42 CHRONIC LEFT-SIDED LOW BACK PAIN WITH LEFT-SIDED SCIATICA: Primary | ICD-10-CM

## 2022-09-19 PROCEDURE — 97530 THERAPEUTIC ACTIVITIES: CPT | Performed by: PHYSICAL THERAPIST

## 2022-09-19 PROCEDURE — 97110 THERAPEUTIC EXERCISES: CPT | Performed by: PHYSICAL THERAPIST

## 2022-09-19 NOTE — PROGRESS NOTES
"Physical Therapy Daily Treatment Note      Patient: Igor De Luna   : 1960  Referring practitioner: Brooks Wong PA-C  Date of Initial Visit: Type: THERAPY  Noted: 2022  Today's Date: 2022  Patient seen for 7 sessions           Subjective  Igor De Luna reports: \"not too bad. Now I did report that I wasn't having any foot numbness but it returned Saturday and then this morning. Brief, there and gone, no know cause.\"       Objective   See Exercise, Manual, and Modality Logs for complete treatment.       Assessment/Plan  Igor noted that he felt muscle activation and described \"nagging\" sensation on L during paloff press, R side towards resistance. Igor noted that he leaves here with dull soreness. \"I do a lot of stretching and all when I do my exercises at home.\"  Skilled care required for further strengthening to aid pain control. Feel Igor is close to discharge to [facility] as he is performing many exercises (I).    Visit Diagnoses:    ICD-10-CM ICD-9-CM   1. Chronic left-sided low back pain with left-sided sciatica  M54.42 724.2    G89.29 724.3     338.29   2. Weakness of both lower extremities  R29.898 729.89   3. Impaired flexibility of lower extremity  R29.898 781.99       Progress per Plan of Care and Progress strengthening /stabilization /functional activity           Timed:  Manual Therapy:         mins  24402;  Therapeutic Exercise:    20     mins  83512;     Neuromuscular Lino:        mins  96728;    Therapeutic Activity:     10     mins  38372;     Gait Training:           mins  92755;     Ultrasound:          mins  61322;    Electrical Stimulation:         mins  21759 ( );  Aquatics  __   mins   28815    Untimed:  Electrical Stimulation:         mins  54523 ( );  Mechanical Traction:         mins  79438;     Timed Treatment:   30   mins   Total Treatment:     30   mins    Electronically Signed:  Giovanna Lee PTA  Physical Therapist Assistant    [place] [employer] license " [id number]

## 2022-09-22 ENCOUNTER — TREATMENT (OUTPATIENT)
Dept: PHYSICAL THERAPY | Facility: CLINIC | Age: 62
End: 2022-09-22

## 2022-09-22 DIAGNOSIS — G89.29 CHRONIC LEFT-SIDED LOW BACK PAIN WITH LEFT-SIDED SCIATICA: Primary | ICD-10-CM

## 2022-09-22 DIAGNOSIS — R29.898 WEAKNESS OF BOTH LOWER EXTREMITIES: ICD-10-CM

## 2022-09-22 DIAGNOSIS — R29.898 IMPAIRED FLEXIBILITY OF LOWER EXTREMITY: ICD-10-CM

## 2022-09-22 DIAGNOSIS — M54.42 CHRONIC LEFT-SIDED LOW BACK PAIN WITH LEFT-SIDED SCIATICA: Primary | ICD-10-CM

## 2022-09-22 PROCEDURE — 97530 THERAPEUTIC ACTIVITIES: CPT | Performed by: PHYSICAL THERAPIST

## 2022-09-22 PROCEDURE — 97110 THERAPEUTIC EXERCISES: CPT | Performed by: PHYSICAL THERAPIST

## 2022-09-22 NOTE — PROGRESS NOTES
Physical Therapy Daily Treatment Note    Patient: Igor De Luna   : 1960  Diagnosis/ICD-10 Code:  Chronic left-sided low back pain with left-sided sciatica [M54.42, G89.29]  Referring practitioner: Brooks Wong PA-C  Date of Initial Visit: Type: THERAPY  Noted: 2022  Today's Date: 2022  Patient seen for 8 sessions           Subjective: Pt reporting he has been doing better with his low back pain since he has been going to the gym. Reporting another instance of tingling in his foot, but this comes and goes. He will going down to [place] for 3-4 weeks next month.     Objective   See Exercise, Manual, and Modality Logs for complete treatment.       Assessment/Plan: Pt tolerated today's session well, plan to have him continue his workouts at the gym over the next week to assess symptom response, then will likely DC next visit before he goes out of town. Pt would benefit from continued skilled therapy to address deficits. Progress per plan of care.             Manual Therapy:    0     mins  44063;  Therapeutic Exercise:    16     mins  13062;     Neuromuscular Lino:    0    mins  24496;    Therapeutic Activity:     12     mins  36742;     Gait Trainin     mins  20548;     Ultrasound:     0     mins  62265;    Electrical Stimulation:    0     mins  46788 ( );  Dry Needling     0     mins self-pay;  Aquatic Therapy    0     mins  51607;  Mechanical Traction    0     mins  75447  Moist Heat     0     mins  No charge    Timed Treatment:   28   mins   Total Treatment:     28   mins    Manish Rodriges PT  Physical Therapist    Electronically signed 2022    [place] License: PT - [id number]

## 2022-09-29 ENCOUNTER — TREATMENT (OUTPATIENT)
Dept: PHYSICAL THERAPY | Facility: CLINIC | Age: 62
End: 2022-09-29

## 2022-09-29 DIAGNOSIS — G89.29 CHRONIC LEFT-SIDED LOW BACK PAIN WITH LEFT-SIDED SCIATICA: Primary | ICD-10-CM

## 2022-09-29 DIAGNOSIS — R29.898 WEAKNESS OF BOTH LOWER EXTREMITIES: ICD-10-CM

## 2022-09-29 DIAGNOSIS — R29.898 IMPAIRED FLEXIBILITY OF LOWER EXTREMITY: ICD-10-CM

## 2022-09-29 DIAGNOSIS — M54.42 CHRONIC LEFT-SIDED LOW BACK PAIN WITH LEFT-SIDED SCIATICA: Primary | ICD-10-CM

## 2022-09-29 PROCEDURE — 97110 THERAPEUTIC EXERCISES: CPT | Performed by: PHYSICAL THERAPIST

## 2022-09-29 NOTE — PROGRESS NOTES
Physical Therapy Daily Treatment Note    Patient: Igor De Luna   : 1960  Diagnosis/ICD-10 Code:  Chronic left-sided low back pain with left-sided sciatica [M54.42, G89.29]  Referring practitioner: Brooks Wong PA-C  Date of Initial Visit: Type: THERAPY  Noted: 2022  Today's Date: 2022  Patient seen for 9 sessions           Subjective: Pt reporting he hasn't felt the numbness sensation in his leg since the last visit. He has continued his routine at the gym, no difficulty here. He is leaving for [place] for 3 weeks to see his in-laws before returning.     Objective   See Exercise, Manual, and Modality Logs for complete treatment.       Assessment/Plan: Pt has progressed well and is having minimal symptoms at this time. He has a good program going to the gym. Pt will return if needed, if not seen in 60 days, then DC.           Manual Therapy:    0     mins  59042;  Therapeutic Exercise:    14     mins  88706;     Neuromuscular Lino:    0    mins  52225;    Therapeutic Activity:     0     mins  25538;     Gait Trainin     mins  78113;     Ultrasound:     0     mins  75198;    Electrical Stimulation:    0     mins  68965 ( );  Dry Needling     0     mins self-pay;  Aquatic Therapy    0     mins  20731;  Mechanical Traction    0     mins  92806  Moist Heat     0     mins  No charge    Timed Treatment:   14   mins   Total Treatment:     14   mins    Manish Rodriges PT  Physical Therapist    Electronically signed 2022    [place] License: PT - [id number]

## 2024-04-09 ENCOUNTER — OFFICE VISIT (OUTPATIENT)
Dept: SURGERY | Facility: CLINIC | Age: 64
End: 2024-04-09
Payer: OTHER GOVERNMENT

## 2024-04-09 ENCOUNTER — PREP FOR SURGERY (OUTPATIENT)
Dept: OTHER | Facility: HOSPITAL | Age: 64
End: 2024-04-09
Payer: OTHER GOVERNMENT

## 2024-04-09 VITALS
HEART RATE: 72 BPM | HEIGHT: 64 IN | SYSTOLIC BLOOD PRESSURE: 124 MMHG | WEIGHT: 160 LBS | DIASTOLIC BLOOD PRESSURE: 83 MMHG | BODY MASS INDEX: 27.31 KG/M2

## 2024-04-09 DIAGNOSIS — Z12.11 SCREENING FOR MALIGNANT NEOPLASM OF COLON: Primary | ICD-10-CM

## 2024-04-09 RX ORDER — POLYETHYLENE GLYCOL 3350 17 G/17G
POWDER, FOR SOLUTION ORAL
Qty: 238 PACKET | Refills: 0 | Status: SHIPPED | OUTPATIENT
Start: 2024-04-09

## 2024-04-09 RX ORDER — SODIUM CHLORIDE 0.9 % (FLUSH) 0.9 %
3 SYRINGE (ML) INJECTION EVERY 12 HOURS SCHEDULED
OUTPATIENT
Start: 2024-04-09

## 2024-04-09 RX ORDER — FLUTICASONE PROPIONATE 50 MCG
SPRAY, SUSPENSION (ML) NASAL
COMMUNITY
Start: 2024-02-19

## 2024-04-09 RX ORDER — SODIUM CHLORIDE 0.9 % (FLUSH) 0.9 %
10 SYRINGE (ML) INJECTION AS NEEDED
OUTPATIENT
Start: 2024-04-09

## 2024-04-09 RX ORDER — BUTALBITAL, ACETAMINOPHEN AND CAFFEINE 50; 325; 40 MG/1; MG/1; MG/1
TABLET ORAL
COMMUNITY
Start: 2024-02-09

## 2024-04-09 RX ORDER — CETIRIZINE HYDROCHLORIDE 10 MG/1
1 TABLET ORAL DAILY
COMMUNITY
Start: 2024-02-09

## 2024-04-09 RX ORDER — SODIUM CHLORIDE 9 MG/ML
40 INJECTION, SOLUTION INTRAVENOUS AS NEEDED
OUTPATIENT
Start: 2024-04-09

## 2024-04-09 RX ORDER — ATORVASTATIN CALCIUM 10 MG/1
10 TABLET, FILM COATED ORAL DAILY
COMMUNITY
Start: 2024-02-12 | End: 2025-02-11

## 2024-04-09 NOTE — PROGRESS NOTES
Chief Complaint: Colonoscopy (CONSULT)    Subjective      Colonoscopy consultation       History of Present Illness  Igor De Luna is a 64 y.o. male presents to [facility] for e colonoscopy consultation.    Patient presents today on referral from Isma Velasquez for colonoscopy consultation.  Patient denies any abdominal pain, change in bowel habit, or rectal bleeding.  Denies any family history of colorectal cancer.  Patient reports last colonoscopy was 12+ years ago in [place] and was normal.    Patient does admit to MATTHEW.  He does use a CPAP.    Denies any cardiac issues.  Denies taking GLP-1 receptors.  Objective     Past Medical History:   Diagnosis Date    Back problem     Headache     Hearing loss        Past Surgical History:   Procedure Laterality Date    SHOULDER SURGERY         Outpatient Medications Marked as Taking for the 4/9/24 encounter (Office Visit) with Lorelei Faustin APRN   Medication Sig Dispense Refill    butalbital-acetaminophen-caffeine (FIORICET, ESGIC) -40 MG per tablet       cetirizine (zyrTEC) 10 MG tablet Take 1 tablet by mouth Daily.      cholecalciferol (VITAMIN D3) 25 MCG (1000 UT) tablet Take 1 tablet by mouth Daily.      Cyanocobalamin ER 1000 MCG tablet controlled-release Take  by mouth.      diclofenac (VOLTAREN) 75 MG EC tablet Take 1 tablet by mouth 2 (Two) Times a Day.      ELDERBERRY PO Take  by mouth.      Flaxseed, Linseed, (FLAX SEED OIL PO) Take  by mouth.      fluticasone (FLONASE) 50 MCG/ACT nasal spray SPRAY 1 SPRAY INTO EACH NOSTRIL DAILY      methocarbamol (ROBAXIN) 500 MG tablet Take 1 tablet by mouth 3 (Three) Times a Day.      MULTIPLE VITAMINS-CALCIUM PO Take  by mouth.      Omega-3 Fatty Acids (fish oil) 1000 MG capsule capsule Take  by mouth.      Turmeric 500 MG capsule Take  by mouth.      Vitamin A 3 MG (62471 UT) capsule Take 1 capsule by mouth Daily.      Zinc 50 MG capsule Take  by mouth.         No Known  "Allergies     Family History   Problem Relation Age of Onset    Cancer Father     Cancer Sister     Liver disease Brother        Social History     Socioeconomic History    Marital status:    Tobacco Use    Smoking status: Never    Smokeless tobacco: Never   Vaping Use    Vaping status: Never Used   Substance and Sexual Activity    Alcohol use: Not Currently    Drug use: Never    Sexual activity: Defer       Review of Systems   Constitutional:  Negative for chills and fever.   Gastrointestinal:  Negative for abdominal distention, abdominal pain, anal bleeding, blood in stool, constipation, diarrhea and rectal pain.        Vital Signs:   /83 (BP Location: Right arm)   Pulse 72   Ht 162.6 cm (64\")   Wt 72.6 kg (160 lb)   BMI 27.46 kg/m²      Physical Exam  Vitals and nursing note reviewed.   Constitutional:       General: He is not in acute distress.     Appearance: Normal appearance.   HENT:      Head: Normocephalic.   Cardiovascular:      Rate and Rhythm: Normal rate.   Pulmonary:      Effort: Pulmonary effort is normal.      Breath sounds: No stridor.   Abdominal:      Palpations: Abdomen is soft.      Tenderness: There is no guarding.   Musculoskeletal:         General: No deformity. Normal range of motion.   Skin:     General: Skin is warm and dry.      Coloration: Skin is not jaundiced.   Neurological:      General: No focal deficit present.      Mental Status: He is alert and oriented to person, place, and time.   Psychiatric:         Mood and Affect: Mood normal.         Thought Content: Thought content normal.          Result Review :          []  Laboratory  []  Radiology  []  Pathology  []  Microbiology  []  EKG/Telemetry   []  Cardiology/Vascular   []  Old records  I spent 15 minutes caring for Igor on this date of service. This time includes time spent by me in the following activities: reviewing tests, obtaining and/or reviewing a separately obtained history, performing a medically " appropriate examination and/or evaluation, ordering medications, tests, or procedures, and documenting information in the medical record.     Assessment and Plan    Diagnoses and all orders for this visit:    1. Screening for malignant neoplasm of colon (Primary)    Other orders  -     polyethylene glycol (MIRALAX) 17 g packet; Take as directed.  Instructions given in office.  Dispense: 238 g bottle  Dispense: 238 packet; Refill: 0        Follow Up   Return for Schedule colonoscopy with Dr. Lee on [facility]/26/[facility].    Hospital arrival time:10:30    Possible risks/complications, benefits, and alternatives to surgical or invasive procedures have been explained to patient and/or legal guardian.    Patient has been evaluated and can tolerate anesthesia and/or sedation. Risks, benefits, and alternatives to anesthesia and sedation have been explained to the patient and/or legal guardian. Patient verbalizes understanding and is willing to proceed with the above plan.     Patient was given instructions and counseling regarding his condition or for health maintenance advice. Please see specific information pulled into the AVS if appropriate.

## 2024-08-23 ENCOUNTER — ANESTHESIA EVENT (OUTPATIENT)
Dept: GASTROENTEROLOGY | Facility: HOSPITAL | Age: 64
End: 2024-08-23
Payer: OTHER GOVERNMENT

## 2024-08-23 NOTE — ANESTHESIA PREPROCEDURE EVALUATION
Anesthesia Evaluation     Patient summary reviewed and Nursing notes reviewed   NPO Solid Status: > 8 hours  NPO Liquid Status: > 4 hours           Airway   Mallampati: I  TM distance: <3 FB  Neck ROM: full  No difficulty expected and Anterior  Dental - normal exam     Pulmonary     breath sounds clear to auscultation  Cardiovascular - normal exam  Exercise tolerance: good (4-7 METS)    Rhythm: regular  Rate: normal    (+) hyperlipidemia      Neuro/Psych  (+) headaches  GI/Hepatic/Renal/Endo      Musculoskeletal     Abdominal    Substance History      OB/GYN          Other        ROS/Med Hx Other: Screening     No EKG on file                     Anesthesia Plan    ASA 2     general   total IV anesthesia  (Total IV Anesthesia    Patient understands anesthesia not responsible for dental damage.  )  intravenous induction     Anesthetic plan, risks, benefits, and alternatives have been provided, discussed and informed consent has been obtained with: patient.  Pre-procedure education provided  Plan discussed with CRNA.      CODE STATUS:

## 2024-08-26 ENCOUNTER — ANESTHESIA (OUTPATIENT)
Dept: GASTROENTEROLOGY | Facility: HOSPITAL | Age: 64
End: 2024-08-26
Payer: OTHER GOVERNMENT

## 2024-08-26 ENCOUNTER — TELEPHONE (OUTPATIENT)
Dept: SURGERY | Facility: CLINIC | Age: 64
End: 2024-08-26
Payer: OTHER GOVERNMENT

## 2024-08-26 ENCOUNTER — HOSPITAL ENCOUNTER (OUTPATIENT)
Facility: HOSPITAL | Age: 64
Setting detail: HOSPITAL OUTPATIENT SURGERY
Discharge: HOME | End: 2024-08-26
Attending: STUDENT IN AN ORGANIZED HEALTH CARE EDUCATION/TRAINING PROGRAM | Admitting: NURSE PRACTITIONER
Payer: OTHER GOVERNMENT

## 2024-08-26 VITALS
WEIGHT: 152.56 LBS | OXYGEN SATURATION: 95 % | RESPIRATION RATE: 20 BRPM | TEMPERATURE: 98.5 F | DIASTOLIC BLOOD PRESSURE: 72 MMHG | BODY MASS INDEX: 26.19 KG/M2 | SYSTOLIC BLOOD PRESSURE: 114 MMHG | HEART RATE: 79 BPM

## 2024-08-26 DIAGNOSIS — Z12.11 SCREENING FOR MALIGNANT NEOPLASM OF COLON: ICD-10-CM

## 2024-08-26 PROCEDURE — 25010000002 PROPOFOL 10 MG/ML EMULSION: Performed by: NURSE ANESTHETIST, CERTIFIED REGISTERED

## 2024-08-26 PROCEDURE — 25810000003 LACTATED RINGERS PER 1000 ML

## 2024-08-26 RX ORDER — SODIUM CHLORIDE 0.9 % (FLUSH) 0.9 %
10 SYRINGE (ML) INJECTION AS NEEDED
Status: DISCONTINUED | OUTPATIENT
Start: 2024-08-26 | End: 2024-08-26 | Stop reason: HOSPADM

## 2024-08-26 RX ORDER — SODIUM CHLORIDE, SODIUM LACTATE, POTASSIUM CHLORIDE, CALCIUM CHLORIDE 600; 310; 30; 20 MG/100ML; MG/100ML; MG/100ML; MG/100ML
30 INJECTION, SOLUTION INTRAVENOUS CONTINUOUS
Status: DISCONTINUED | OUTPATIENT
Start: 2024-08-26 | End: 2024-08-26 | Stop reason: HOSPADM

## 2024-08-26 RX ORDER — ONDANSETRON 4 MG/1
4 TABLET, ORALLY DISINTEGRATING ORAL ONCE AS NEEDED
Status: DISCONTINUED | OUTPATIENT
Start: 2024-08-26 | End: 2024-08-26 | Stop reason: HOSPADM

## 2024-08-26 RX ORDER — SODIUM CHLORIDE 9 MG/ML
40 INJECTION, SOLUTION INTRAVENOUS AS NEEDED
Status: DISCONTINUED | OUTPATIENT
Start: 2024-08-26 | End: 2024-08-26 | Stop reason: HOSPADM

## 2024-08-26 RX ORDER — SODIUM CHLORIDE 0.9 % (FLUSH) 0.9 %
3 SYRINGE (ML) INJECTION EVERY 12 HOURS SCHEDULED
Status: DISCONTINUED | OUTPATIENT
Start: 2024-08-26 | End: 2024-08-26 | Stop reason: HOSPADM

## 2024-08-26 RX ORDER — PROPOFOL 10 MG/ML
VIAL (ML) INTRAVENOUS AS NEEDED
Status: DISCONTINUED | OUTPATIENT
Start: 2024-08-26 | End: 2024-08-26 | Stop reason: SURG

## 2024-08-26 RX ORDER — LIDOCAINE HYDROCHLORIDE 20 MG/ML
INJECTION, SOLUTION EPIDURAL; INFILTRATION; INTRACAUDAL; PERINEURAL AS NEEDED
Status: DISCONTINUED | OUTPATIENT
Start: 2024-08-26 | End: 2024-08-26 | Stop reason: SURG

## 2024-08-26 RX ADMIN — SODIUM CHLORIDE, POTASSIUM CHLORIDE, SODIUM LACTATE AND CALCIUM CHLORIDE 30 ML/HR: 600; 310; 30; 20 INJECTION, SOLUTION INTRAVENOUS at 12:24

## 2024-08-26 RX ADMIN — PROPOFOL 100 MG: 10 INJECTION, EMULSION INTRAVENOUS at 12:45

## 2024-08-26 RX ADMIN — PROPOFOL 200 MCG/KG/MIN: 10 INJECTION, EMULSION INTRAVENOUS at 12:46

## 2024-08-26 RX ADMIN — LIDOCAINE HYDROCHLORIDE 80 MG: 20 INJECTION, SOLUTION INTRAVENOUS at 12:45

## 2024-08-26 NOTE — H&P
[facility]   GENERAL SURGERY  HISTORY AND PHYSICAL    Patient Name: Igor De Luna  : 1960  MRN: [id number]  Primary Care Physician:  Ashley Jang APRN  Date of admission: 2024    Subjective     Chief Complaint:  screening colonoscopy    HPI:  Igor De Luna is a 64 y.o. male who presents today on referral from Isma Velasquez for colonoscopy consultation.  Patient denies any abdominal pain, change in bowel habit, or rectal bleeding.  Denies any family history of colorectal cancer.  Patient reports last colonoscopy was 12+ years ago in [place] and was normal.     Personal History   Allergies:  No Known Allergies    Home Medications:  Prior to Admission medications    Medication Sig Start Date End Date Taking? Authorizing Provider   atorvastatin (LIPITOR) 10 MG tablet Take 1 tablet by mouth Daily.  Patient not taking: Reported on 2024  Kenya Mendenhall MD   butalbital-acetaminophen-caffeine (FIORICET, ESGIC) -40 MG per tablet  24   Kenya Mendenhall MD   cetirizine (zyrTEC) 10 MG tablet Take 1 tablet by mouth Daily. 24   Kenya Mendenhall MD   cholecalciferol (VITAMIN D3) 25 MCG (1000 UT) tablet Take 1 tablet by mouth Daily.    Kenya Mendenhall MD   Cyanocobalamin ER 1000 MCG tablet controlled-release Take  by mouth.    Kenya Mendenhall MD   diclofenac (VOLTAREN) 75 MG EC tablet Take 1 tablet by mouth 2 (Two) Times a Day. 22   Kenya Mendenhall MD   ELDERBERRY PO Take  by mouth.    Kenya Mendenhall MD   fexofenadine (ALLEGRA) 180 MG tablet Take 180 mg by mouth Daily.  Patient not taking: Reported on 2024    Kenya Mendenhall MD   Flaxseed, Linseed, (FLAX SEED OIL PO) Take  by mouth.    Kenya Mendenhall MD   fluticasone (FLONASE) 50 MCG/ACT nasal spray SPRAY 1 SPRAY INTO EACH NOSTRIL DAILY 24   Kenya Mendenhall MD   methocarbamol (ROBAXIN) 500 MG tablet Take 1 tablet by mouth 3 (Three) Times  a Day. 4/25/22   Kenya Mendenhall MD   MULTIPLE VITAMINS-CALCIUM PO Take  by mouth.    Kenya Mendenhall MD   Omega-3 Fatty Acids (fish oil) 1000 MG capsule capsule Take  by mouth.    Kenya Mendenhall MD   polyethylene glycol (MIRALAX) 17 g packet Take as directed.  Instructions given in office.  Dispense: 238 g bottle 4/9/24   Ramin, April, APRN   Quercetin 250 MG tablet Take  by mouth.  Patient not taking: Reported on 4/9/2024    Kenya Mendenhall MD   sulfamethoxazole-trimethoprim (BACTRIM DS,SEPTRA DS) 800-160 MG per tablet Take 1 tablet by mouth 2 (Two) Times a Day.  Patient not taking: Reported on 4/9/2024 7/23/22   Jarret Mack PA-C   Turmeric 500 MG capsule Take  by mouth.    Kenya Mendenhall MD   Vitamin A 3 MG (98655 UT) capsule Take 1 capsule by mouth Daily.    Kenya Mendenhall MD   Zinc 50 MG capsule Take  by mouth.    Kenya Mendenhall MD   zinc sulfate (ZINCATE) 220 (50 Zn) MG capsule Take 220 mg by mouth Daily.  Patient not taking: Reported on 4/9/2024    Kenya Mendenhall MD       Past Medical History:   Diagnosis Date    Back problem     Headache     Hearing loss        Past Surgical History:   Procedure Laterality Date    SHOULDER SURGERY         Social History:  reports that he has never smoked. He has never used smokeless tobacco. He reports that he does not currently use alcohol. He reports that he does not use drugs.    Family History: family history includes Cancer in his father and sister; Liver disease in his brother. Otherwise pertinent FHx was reviewed and not pertinent to current issue.    Review of Systems: Review of systems is noncontributory besides as mentioned in above HPI section.   All systems were reviewed and negative except for: none    Objective   Vitals:   Temp:  [97.8 °F (36.6 °C)] 97.8 °F (36.6 °C)  Heart Rate:  [83] 83  Resp:  [16] 16  BP: (134)/(69) 134/69  Physical Exam   Constitutional: healthy appearing, alert, no acute  "distress, reliable historian  HENT:  NCAT, no visible deformities or lesions  Eyes:  sclerae clear, conjunctivae clear, EOMI  Neck:  normal appearance, no masses, trachea midline  Respiratory:  breathing not labored, respiratory effort appears normal  Cardiovascular:  heart regular rate and rhythm  Abdomen:  soft, nontender, nondistended    Skin and subcutaneous tissue:  no visible concerning rashes or lesions, no jaundice  Musculoskeletal: moving all extremities symmetrically and purposefully  Neurologic:  no obvious motor or sensory deficits, cerebellar function without any obvious abnormalities, alert & oriented x 3, speech clear  Psychiatric:  judgment and insight intact, mood normal, affect appropriate, cooperative    CBC          2/9/2024    09:34   CBC   WBC 7.39       RBC 4.97       Hemoglobin 15.7       Hematocrit 45.7       MCV 92.0       MCH 31.6       MCHC 34.4       RDW 12.4       Platelets 253          Details          This result is from an external source.                             Invalid input(s): \"PROT\"      No results found for: \"LIPASE\"  No results found for: \"INR\"  No results found for: \"LACTATE\"    Radiology:  No orders to display          LABS:  Lab Results (last 48 hours)       ** No results found for the last 48 hours. **            IMAGING:  Imaging Results (Last 48 Hours)       ** No results found for the last 48 hours. **            Result Review:  I have personally reviewed the following checkmarked results from the time of this admission to 8/26/2024 11:27 EDT:  [x]  Laboratory  []  Microbiology  []  Radiology  []  EKG/Telemetry   []  Cardiology/Vascular   []  Pathology  []  Old records  []  Other:        Assessment & Plan   Assessment / Plan     Assessment:  Active Hospital Problems:  Active Hospital Problems    Diagnosis     **Screening for malignant neoplasm of colon        Plan:   1. Screening for malignant neoplasm of colon (Primary)     Plan today for screening colonoscopy with " possible biopsy and any other indicated procedure.  Risks/benefits/alternatives of the procedure were explained to the patient and he was agreeable to proceed.      Electronically signed by Corey Lee MD, 08/26/24, 11:27 AM EDT.

## 2024-08-26 NOTE — TELEPHONE ENCOUNTER
----- Message from Corey Lee sent at 8/26/2024  1:01 PM EDT -----  10 year colon follow up  Patient aware     Date of most recent Colonoscopy: 8/26/2024  Date of next expected Colonoscopy: 8/26/2034  Recall has been entered into the patient's chart and [facility] [place] has been updated

## 2024-08-26 NOTE — ANESTHESIA POSTPROCEDURE EVALUATION
Patient: Igor De Luna    Procedure Summary       Date: 08/26/24 Room / Location: [facility] ENDOSCOPY 3 / [facility] ENDOSCOPY    Anesthesia Start: 1244 Anesthesia Stop: 1306    Procedure: COLONOSCOPY Diagnosis:       Screening for malignant neoplasm of colon      (Screening for malignant neoplasm of colon [Z12.11])    Surgeons: Corey Lee MD Provider: Beau Santiago CRNA    Anesthesia Type: general ASA Status: 2            Anesthesia Type: general    Vitals  Vitals Value Taken Time   BP 94/82 08/26/24 1316   Temp 36.3 °C (97.4 °F) 08/26/24 1305   Pulse 86 08/26/24 1319   Resp 18 08/26/24 1310   SpO2 95 % 08/26/24 1319   Vitals shown include unfiled device data.        Post Anesthesia Care and Evaluation    Post-procedure mental status: acceptable.  Pain management: satisfactory to patient    Airway patency: patent  Anesthetic complications: No anesthetic complications    Cardiovascular status: acceptable  Respiratory status: acceptable    Comments: Per chart review

## (undated) DEVICE — Device: Brand: DEFENDO AIR/WATER/SUCTION AND BIOPSY VALVE

## (undated) DEVICE — SOLIDIFIER LIQLOC PLS 1500CC BT

## (undated) DEVICE — GOWN,REINFRCE,POLY,SIRUS,BREATH SLV,XXLG: Brand: MEDLINE

## (undated) DEVICE — Device

## (undated) DEVICE — SOL IRR NACL 0.9PCT BT 1000ML

## (undated) DEVICE — CONN JET HYDRA H20 AUXILIARY DISP

## (undated) DEVICE — STERILE POLYISOPRENE POWDER-FREE SURGICAL GLOVES WITH EMOLLIENT COATING: Brand: PROTEXIS

## (undated) DEVICE — GLV SURG SENSICARE PI ORTHO SZ6.5 LF STRL

## (undated) DEVICE — SOL IRRG H2O PL/BG 1000ML STRL

## (undated) DEVICE — GLV SURG SENSICARE PI ORTHO SZ8 LF STRL

## (undated) DEVICE — LINER SURG CANSTR SXN S/RIGD 1500CC